# Patient Record
Sex: MALE | Race: WHITE
[De-identification: names, ages, dates, MRNs, and addresses within clinical notes are randomized per-mention and may not be internally consistent; named-entity substitution may affect disease eponyms.]

---

## 2018-03-29 ENCOUNTER — HOSPITAL ENCOUNTER (OUTPATIENT)
Dept: HOSPITAL 45 - C.ULTR | Age: 23
Discharge: HOME | End: 2018-03-29
Attending: FAMILY MEDICINE
Payer: COMMERCIAL

## 2018-03-29 DIAGNOSIS — R10.11: Primary | ICD-10-CM

## 2018-03-29 DIAGNOSIS — R11.0: ICD-10-CM

## 2018-03-29 NOTE — DIAGNOSTIC IMAGING REPORT
ABDOMEN LIMITED (US)



CLINICAL HISTORY: 23 years-old Male presenting with RUQ ABD Pain, nausea. 



TECHNIQUE: Real-time grayscale and limited color Doppler ultrasound imaging of

the abdomen limited to the right upper quadrant was performed. 



COMPARISON: None.



FINDINGS:



Pancreas: Visualized portions of the pancreatic head and body normal.



Liver: Normal echogenicity and echotexture. The liver measures 14.6 cm in

maximal sagittal dimension. No sonographic evidence of hepatic mass. Main portal

vein patent with normal directional flow.



Biliary: No intrahepatic biliary ductal dilatation. Common bile duct measures up

to 3 mm in diameter.



Gallbladder: No evidence of gallstones, gallbladder wall thickening, gallbladder

distention, or pericholecystic fluid or inflammatory change.    



Right kidney: Normal in appearance. No hydronephrosis.



Ascites: None.



Other: None.



IMPRESSION: 

No cholelithiasis or biliary ductal dilatation.







Electronically signed by:  Parker Belcher M.D.

3/29/2018 5:42 PM



Dictated Date/Time:  3/29/2018 5:42 PM